# Patient Record
Sex: MALE | URBAN - METROPOLITAN AREA
[De-identification: names, ages, dates, MRNs, and addresses within clinical notes are randomized per-mention and may not be internally consistent; named-entity substitution may affect disease eponyms.]

---

## 2019-03-25 NOTE — PROGRESS NOTES
"Date of Service: 3/25/2019    Consult Requested By: ANA LILIA    Reason for Consultation: Chronic HCV     History of Present Illness:   Carrillo Garcia is a 33 y.o.  Pt has a past medical history of treatment naive HCV with associated cirrhosis per Fibrosure.  Compensated , CTP class A.  Patient with no specific complaints today other than generalized fatigue.    Genotype: Unknown    resistance: Unknown  Prior HCV treatment: None  Diagnosed with HCV:  2010  Risk factors:  IVDU   HCV viral load at start of treatment:  No lab   Fibrosis/cirrhosis: 0.75, F4  Child-Florence-Ceja Score: A      Review Of Systems:  Review of Systems   Constitutional: Positive for malaise/fatigue. Negative for fever and weight loss.   HENT: Negative for hearing loss.    Eyes: Positive for blurred vision.   Respiratory: Negative for cough, sputum production and shortness of breath.    Cardiovascular: Negative for chest pain and leg swelling.   Gastrointestinal: Positive for abdominal pain. Negative for constipation, diarrhea, nausea and vomiting.   Genitourinary: Negative for dysuria.   Musculoskeletal: Positive for myalgias. Negative for joint pain.   Skin: Negative for itching and rash.   Neurological: Positive for headaches.   Psychiatric/Behavioral: The patient is not nervous/anxious.      PMH:   None other then HCV      PSH:  Unknown       FAMILY HX:  Father with heart disease    SOCIAL HX:  ETOH: Denies, prior heavy use   Tobacco: Quit 7 years ago, smoked 1 ppd for 10 years   Drug use: Denies, prior IVDU  Sexual activity: Denies    Allergies/Intolerances:  NKDA    Other Current Medications:  No current outpatient prescriptions on file.  Confirmed    Most Recent Vital Signs:  Wgt: 298 pnds  Hgt: 6'1\"  Temp:  97.6  HR: 78  BP: 138/87  Ox: 98%     Physical Exam   This consultation was conducted the physical exam report from the nurse on site.    Physical Exam   Constitutional: He is oriented to person, place, and time and well-developed, " well-nourished, and in no distress.   HENT:   Head: Normocephalic and atraumatic.   Eyes: Conjunctivae and EOM are normal.   Cardiovascular: Normal rate, regular rhythm and normal heart sounds.    Pulmonary/Chest: Effort normal and breath sounds normal.   Abdominal: Soft. Bowel sounds are normal. He exhibits no distension. There is no tenderness. There is no guarding.   Musculoskeletal: Normal range of motion. He exhibits no edema.   Neurological: He is alert and oriented to person, place, and time.   Skin: Skin is warm and dry.   Psychiatric: Affect normal.       Vaccines    There is no immunization history on file for this patient.    HAV: none documented  HBV: none documented  PCN 23 - none documented  TDap - none documented  Influ - none documented    Pertinent Lab Results:    Date  HCV Viral Load      Screening:   HBV  - Surface antigen: 3/2/19 neg   - Surface antibody IgG:   - Core antibody IgG: 3/2/19 total neg   HAV IgG antibody:  HIV: 3/2/19 neg         CBC  Date  WBC  HGB  PLAT  3/2/19  7.2  18  165      LABS  Date  CR/BUN GFR  E-LYTS  3/2/19   0.95/18 WNL  WNL      Date  PT/INR  TBili  AlkPh  AST ALT Album  3/2/19  11/ 1.0  1.4  79  71 188 4.4    Lipids   Date  Chol Trig HDL VLDL LDL    HgbA1C      Imaging/Studies:    Liver US:   EGD:     ASSESSMENT:     Carrillo Garcia is a 33 y.o.  Pt has a past medical history of treatment naive HCV with associated cirrhosis per Fibrosure.  Compensated , CTP class A.  Patient with no specific complaints today other than generalized fatigue.  He is agreeable to start hepatitis C treatment if recommended.       PLAN:     HCV   AASLD recommendation prior to treatment:    --- Assessment of drug drug interaction: done and no interaction suspected per [place] HEP interaction  -patient on no other medications   --- Counseled patient on proper administration of antibiotics, frequency, food effect, missed doses and crucial importance of adherence.   --- Obtain  Quantitative HCV RNA (viral load) this will need to be obtained PRIOR to starting therapy.  --- Patient will need HCV genotype testing and review PRIOR to starting therapy  --- Hep B surface antibody IgG or total- qual . If patient does NOT have immunity then will need HepB vaccine series.   --- Hgb A1C  --- Fasting Lipid panel   --- Vaccines:  Hep B series if not immune, Hep A series, PCN 23, Tdap   --- US of liver - PRIOR to starting therapy   --- EGD for varices patient has cirrhosis     --- Obtain labs and liver US as above and once reviewed (need genotype and viral load and prefer US prior to starting treatment) then will make recommendation regarding therapy      --- 4 weeks from start of therapy: obtain eGFR, creatinine, hepatic function panel (note: A 10x increase in ALT at any time should prompt immediate DC of therapy, if ALT rising then test q2 weeks)   --- HCV RNA quant at 4 weeks from start of therapy and 12 weeks after completing therapy   --- On all new labs and imaging send documentation once otained to [facility] and notify me.   --- Patient with cirrhosis so will need liver US or other dedicated imaging every 6 months     --- Follow-up visit in ~8 weeks       Chhaya Vides M.D.

## 2019-03-26 ENCOUNTER — TELEMEDICINE2 (OUTPATIENT)
Dept: VASCULAR LAB | Facility: MEDICAL CENTER | Age: 34
End: 2019-03-26
Attending: INTERNAL MEDICINE
Payer: OTHER GOVERNMENT

## 2019-03-26 VITALS
WEIGHT: 298 LBS | TEMPERATURE: 98.6 F | SYSTOLIC BLOOD PRESSURE: 138 MMHG | HEART RATE: 78 BPM | DIASTOLIC BLOOD PRESSURE: 87 MMHG | RESPIRATION RATE: 20 BRPM

## 2019-03-26 DIAGNOSIS — B18.2 CHRONIC HEPATITIS C WITHOUT HEPATIC COMA (HCC): ICD-10-CM

## 2019-03-26 DIAGNOSIS — K74.69 COMPENSATED HCV CIRRHOSIS (HCC): ICD-10-CM

## 2019-03-26 DIAGNOSIS — B19.20 COMPENSATED HCV CIRRHOSIS (HCC): ICD-10-CM

## 2019-03-26 PROCEDURE — 99202 OFFICE O/P NEW SF 15 MIN: CPT | Performed by: INTERNAL MEDICINE

## 2019-03-26 ASSESSMENT — ENCOUNTER SYMPTOMS
FEVER: 0
NAUSEA: 0
COUGH: 0
ABDOMINAL PAIN: 1
DIARRHEA: 0
VOMITING: 0
NERVOUS/ANXIOUS: 0
SPUTUM PRODUCTION: 0
BLURRED VISION: 1
SHORTNESS OF BREATH: 0
MYALGIAS: 1
WEIGHT LOSS: 0
CONSTIPATION: 0
HEADACHES: 1

## 2019-03-28 RX ORDER — VELPATASVIR AND SOFOSBUVIR 100; 400 MG/1; MG/1
1 TABLET, FILM COATED ORAL DAILY
Qty: 28 TAB | Refills: 2 | Status: SHIPPED | OUTPATIENT
Start: 2019-03-28 | End: 2019-04-25

## 2019-05-06 ENCOUNTER — DOCUMENTATION (OUTPATIENT)
Dept: VASCULAR LAB | Facility: MEDICAL CENTER | Age: 34
End: 2019-05-06

## 2019-05-06 DIAGNOSIS — B18.2 CHRONIC HEPATITIS C WITHOUT HEPATIC COMA (HCC): ICD-10-CM

## 2019-05-06 RX ORDER — VELPATASVIR AND SOFOSBUVIR 100; 400 MG/1; MG/1
1 TABLET, FILM COATED ORAL DAILY
Qty: 28 TAB | Refills: 2 | Status: SHIPPED | OUTPATIENT
Start: 2019-05-06 | End: 2019-06-03

## 2019-05-06 NOTE — PROGRESS NOTES
Results obtained as requested per prior note, see below.        ASSESSMENT:      Carrillo Garcia is a 33 y.o.  Pt has a past medical history of treatment naive HCV with associated cirrhosis per Fibrosure.  Compensated , CTP class A.  Patient with no specific complaints today other than generalized fatigue.  He is agreeable to start hepatitis C treatment if recommended.         PLAN:      HCV   AASLD recommendation prior to treatment:    --- Assessment of drug drug interaction: done and no interaction suspected per [place] HEP interaction  -patient on no other medications   --- Counseled patient on proper administration of antibiotics, frequency, food effect, missed doses and crucial importance of adherence.   --- Obtain Quantitative HCV RNA (viral load) this will need to be obtained PRIOR to starting therapy:  5,920,000   --- Patient will need HCV genotype testing and review PRIOR to starting therapy:   1a  --- Hep B surface antibody IgG or total- qual . If patient does NOT have immunity then will need HepB vaccine series. :  Not reactive - NOT immune   --- Hgb A1C: Done 5.1   --- Fasting Lipid panel :  Done   --- Vaccines:  Hep B series if not immune, Hep A series, PCN 23, Tdap   --- US of liver - PRIOR to starting therapy : No concerning findings such as mass.  --- EGD for varices if patient has cirrhosis - no cirrhosis her US      --- START THERAPY  Epclusa- order placed   --- Initiate Hep B vaccine series, if Twinrix given this will also vaccinate for Hep A, other vaccines as above      --- 4 weeks from start of therapy: obtain eGFR, creatinine, hepatic function panel (note: A 10x increase in ALT at any time should prompt immediate DC of therapy, if ALT rising then test q2 weeks)   --- HCV RNA quant at 4 weeks from start of therapy and 12 weeks after completing therapy   --- On all new labs and imaging send documentation once otained to [facility] and notify me.   --- Patient with cirrhosis so will need  liver US or other dedicated imaging every 6 months

## 2019-06-30 NOTE — PROGRESS NOTES
[facility] follow up     Date of Service: 6/29/2019    Referring Physician: ANA LILIA    Chief Complaint:  Follow-up for hepatitis C      History of Present Illness:     Carrillo Garcia is a 34 y.o. Male. Carrillo Garcia is a 33 y.o.  Pt has a past medical history of treatment naive HCV with associated cirrhosis per Fibrosure.  Compensated , CTP class A.  Seen in clinic for treatment of hepatitis C on 3/26/2019, pending labs.  Labs were obtained on 5/6/2019, treatment was initiated.    Patient was started on Epclusa daily on 5/13/2019.  Patient states that he experienced no side effects with this medication, and has not missed any doses.  He did have a couple of episodes of mild headache initially, but these spontaneously resolved quickly.    HCV genotype: 1 a  HCV resistance: Unavailable  Prior treatment status: Naïve  Risk factors: IV drug use  Cirrhosis: FibroSure 0.75, F4 cirrhosis  CTP score: Class A  HCV PCR at start of treatment 5,920,000  HBV serologies: Nonimmune 5/2019  HAV serology: Nonimmune  HIV Ab: -3/2/2019    Review of Systems:  All other systems reviewed and are negative expect as noted in HPI    Past medical history  Chronic hepatitis C  Compensated cirrhosis    No past surgical history on file.    family history  Reviewed and not pertinent.      Social History     Social History   • Marital status: Unknown     Spouse name: N/A   • Number of children: N/A   • Years of education: N/A     Occupational History   • Not on file.     Social History Main Topics   • Smoking status: Former Smoker     Packs/day: 1.00     Years: 10.00     Quit date: 2012   • Smokeless tobacco: Never Used   • Alcohol use No   • Drug use: No   • Sexual activity: Not on file     Other Topics Concern   • Not on file     Social History Narrative   • No narrative on file       No Known Allergies    Medications:  No current outpatient prescriptions on file prior to visit.     No current  facility-administered medications on file prior to visit.        Physical Exam:   This consultation was conducted utilizing secure and encrypted videoconferencing equipment with the assistance of a trained tele-presenter at the originating site.     Vital Signs: T 97.8 HR 74 RR 18 /83 O2 97% weight 300 pounds height 6 foot 1 inch  Vital signs reviewed   Constitutional: Patient is oriented to person, place, and time. Appears well-developed and well-nourished. No distress  Head: Atraumatic, normocephalic  Eyes: Conjunctivae normal, EOM intact. Pupils are equal, round, and reactive to light.   Mouth/Throat: Lips without lesions, good dentition, oropharynx is clear and moist.  Neck: Neck supple. No masses/lymphadenopathy  Cardiovascular: Normal rate, regular rhythm, normal S1S2 and intact distal pulses. No murmur, gallop, or friction rub. No pedal edema.  Pulmonary/Chest: No respiratory distress. Unlabored respiratory effort, lungs clear to auscultation. No wheezes or rales.   Abdominal: Soft, non tender. BS + x 4. No masses or hepatosplenomegaly.   Musculoskeletal: No joint tenderness, swelling, erythema, or restriction of motion noted.  Neurological: Alert and oriented to person, place, and time. No gross cranial nerve deficit.   Skin: Skin is warm and dry. No rashes or embolic phenomena noted on exposed skin  Psychiatric: Normal mood and affect. Behavior is normal.     LABS:  No results found for: WBC, RBC, HEMOGLOBIN, HEMATOCRIT, MCV, MCH, MCHC, MPV  No results found for: SODIUM, POTASSIUM, CHLORIDE, CO2, GLUCOSE, BUN, CREATININE, BUNCREATRAT, GLOMRATE  No results found for: ALKPHOSPHAT, ASTSGOT, ALTSGPT, TBILIRUBIN   No results found for: CPKTOTAL     MICRO:  No results found for: BLOODCULTU, BLDCULT, BCHOLD      Latest pertinent labs were reviewed    IMAGING STUDIES:  Liver ultrasound with no masses noted    Assessment:   Carrillo Garcia is a 34 y.o. male with a history of chronic hepatitis C, compensated  cirrhosis, here for follow-up of hepatitis C.     Plan:   -Please scan in the labs that were presented verbally this visit  -Started Epclusa on 5/13/2019  -4-week follow-up labs not available, but per verbal report from remote RN, AST 25 ALT 27, T bili 1.4, undetectable hepatitis C quantitative PCR  -Continue PO Epclusa 1 tab daily x 12 week course  -Medication interactions.:  No new medications  -Check HCV RNA quantitative PCR at completion of therapy, and at 12 weeks after completing therapy   -On all new labs and imaging send documentation once otained to [facility] and please notify me or Dr. Vides   -This patient with cirrhosis will need hepatocellular carcinoma surveillance every 6 months with imaging. Recommend enrolling in GI cirrhosis clinic  -Recommend hepatitis A and hepatitis B vaccination series    Return to clinic at end of therapy    Pillo Brunner M.D.    Please note that this dictation was created using voice recognition software. I have worked with technical experts from [facility] to optimize the interface.  I have made every reasonable attempt to correct obvious errors, but there may be errors of grammar and possibly content that I did not discover before finalizing the note.

## 2019-07-01 ENCOUNTER — TELEMEDICINE2 (OUTPATIENT)
Dept: VASCULAR LAB | Facility: MEDICAL CENTER | Age: 34
End: 2019-07-01
Attending: INTERNAL MEDICINE
Payer: OTHER GOVERNMENT

## 2019-07-01 DIAGNOSIS — B18.2 CHRONIC HEPATITIS C WITHOUT HEPATIC COMA (HCC): ICD-10-CM

## 2019-07-01 DIAGNOSIS — K74.69 OTHER CIRRHOSIS OF LIVER (HCC): ICD-10-CM

## 2019-07-01 PROCEDURE — 99213 OFFICE O/P EST LOW 20 MIN: CPT | Performed by: INTERNAL MEDICINE

## 2019-10-17 ENCOUNTER — DOCUMENTATION (OUTPATIENT)
Dept: VASCULAR LAB | Facility: MEDICAL CENTER | Age: 34
End: 2019-10-17

## 2019-10-17 NOTE — PROGRESS NOTES
Reviewed labs after completion of Epclusa therapy. HCV PCR undetectable.    Repeat HCV quant PCR 12 weeks after completion of therapy (around 10/27/2019)

## 2019-10-20 NOTE — PROGRESS NOTES
[facility]     Date of Service: 10/20/2019    Referring Physician: ANA LILIA    Chief Complaint:  Follow-up for hepatitis C        History of Present Illness:      Carrillo Garcia is a 34 y.o. Male. Carrillo Garcia is a 33 y.o.  Pt has a past medical history of treatment naive HCV with associated cirrhosis per Fibrosure.  Compensated , CTP class A.  Seen in clinic for treatment of hepatitis C on 3/26/2019, pending labs.  Labs were obtained on 5/6/2019, treatment was initiated.     Patient was started on Epclusa daily on 5/13/2019.  Patient states that he experienced no side effects with this medication, and has not missed any doses.  He did have a couple of episodes of mild headache initially, but these spontaneously resolved quickly.    Patient completed Epclusa 8/4/2019, and HCV quant PCR obtained after completion of therapy was undetectable.  He is nearing 12 weeks after completion of Epclusa.    Reports that his prior symptoms of tiredness, achy joints, an headache have all resolved now.    HCV genotype: 1 a  HCV resistance: Unavailable  Prior treatment status: Naïve  Risk factors: IV drug use  Cirrhosis: FibroSure 0.75, F4 cirrhosis  CTP score: Class A  HCV PCR at start of treatment 5,920,000  HBV serologies: Nonimmune 5/2019  HAV serology: Nonimmune  HIV Ab: -3/2/2019     Review of Systems:  All other systems reviewed and are negative expect as noted in HPI     Past medical history  Chronic hepatitis C  Compensated cirrhosis    No past surgical history on file.    Family history  Reviewed and not pertinent.    Social History     Socioeconomic History   • Marital status: Unknown     Spouse name: Not on file   • Number of children: Not on file   • Years of education: Not on file   • Highest education level: Not on file   Occupational History   • Not on file   Social Needs   • Financial resource strain: Not on file   • Food insecurity:     Worry: Not on file     Inability:  "Not on file   • Transportation needs:     Medical: Not on file     Non-medical: Not on file   Tobacco Use   • Smoking status: Former Smoker     Packs/day: 1.00     Years: 10.00     Pack years: 10.00     Last attempt to quit: 2012     Years since quittin.8   • Smokeless tobacco: Never Used   Substance and Sexual Activity   • Alcohol use: No   • Drug use: No   • Sexual activity: Not on file   Lifestyle   • Physical activity:     Days per week: Not on file     Minutes per session: Not on file   • Stress: Not on file   Relationships   • Social connections:     Talks on phone: Not on file     Gets together: Not on file     Attends Hinduism service: Not on file     Active member of club or organization: Not on file     Attends meetings of clubs or organizations: Not on file     Relationship status: Not on file   • Intimate partner violence:     Fear of current or ex partner: Not on file     Emotionally abused: Not on file     Physically abused: Not on file     Forced sexual activity: Not on file   Other Topics Concern   • Not on file   Social History Narrative   • Not on file       No Known Allergies    Medications:  No current outpatient medications on file prior to visit.     No current facility-administered medications on file prior to visit.        Physical Exam:   This consultation was conducted utilizing secure and encrypted videoconferencing equipment with the assistance of a trained tele-presenter at the originating site.     Vital Signs: T 98.1 HR 73 RR 20 /88 o2 98% Wt 296 Ht 6' 1\"  Vital signs reviewed  Constitutional: Patient is oriented to person, place, and time. Appears well-developed and well-nourished. No distress  Head: Atraumatic, normocephalic  Eyes: Conjunctivae normal, EOM intact. Pupils are equal, round, and reactive to light.   Mouth/Throat: Lips without lesions, oropharynx is clear and moist.  Neck: Neck supple. No masses/lymphadenopathy  Cardiovascular: Normal rate, regular rhythm, " normal S1S2 and intact distal pulses. No murmur, gallop, or friction rub. No pedal edema.  Pulmonary/Chest: No respiratory distress. Unlabored respiratory effort, lungs clear to auscultation. No wheezes or rales.   Abdominal: Soft, non tender. BS + x 4. No masses or hepatosplenomegaly.   Musculoskeletal: No joint tenderness, swelling, erythema, or restriction of motion noted.  Neurological: Alert and oriented to person, place, and time. No gross cranial nerve deficit.   Skin: Skin is warm and dry. No rashes or embolic phenomena noted on exposed skin  Psychiatric: Normal mood and affect. Behavior is normal.     LABS:  No results found for: WBC, RBC, HEMOGLOBIN, HEMATOCRIT, MCV, MCH, MCHC, MPV  No results found for: SODIUM, POTASSIUM, CHLORIDE, CO2, GLUCOSE, BUN, CREATININE, BUNCREATRAT, GLOMRATE  No results found for: ALKPHOSPHAT, ASTSGOT, ALTSGPT, TBILIRUBIN   No results found for: CPKTOTAL     MICRO:  No results found for: BLOODCULTU, BLDCULT, BCHOLD      Latest pertinent labs were reviewed    Assessment:   Carrillo Garcia is a 34 y.o. male with a history of chronic hepatitis C, compensated cirrhosis, here for follow-up after treatment of hepatitis C.      Plan:   -Started Epclusa on 5/13/2019 and completed on 8/4/2019  -After completion of Epclusa therapy, HCV PCR was undetectable  -Repeat HCV quant PCR 12 weeks after completion of therapy (around 10/26/2019)  -This patient with cirrhosis will need hepatocellular carcinoma surveillance every 6 months with imaging. Recommend enrolling in GI cirrhosis clinic  -Completed hepatitis A and hepatitis B vaccination series per verbal report     No follow-up needed at infectious disease clinic, but please send us the above lab    Pillo Brunner M.D.    Please note that this dictation was created using voice recognition software. I have worked with technical experts from [employer] to optimize the interface.  I have made every reasonable attempt to correct obvious  errors, but there may be errors of grammar and possibly content that I did not discover before finalizing the note.

## 2019-10-21 ENCOUNTER — TELEMEDICINE2 (OUTPATIENT)
Dept: VASCULAR LAB | Facility: MEDICAL CENTER | Age: 34
End: 2019-10-21
Attending: INTERNAL MEDICINE
Payer: OTHER GOVERNMENT

## 2019-10-21 DIAGNOSIS — B18.2 CHRONIC HEPATITIS C WITHOUT HEPATIC COMA (HCC): ICD-10-CM

## 2019-10-21 DIAGNOSIS — K74.69 COMPENSATED HCV CIRRHOSIS (HCC): ICD-10-CM

## 2019-10-21 DIAGNOSIS — B19.20 COMPENSATED HCV CIRRHOSIS (HCC): ICD-10-CM

## 2019-10-21 PROCEDURE — 99212 OFFICE O/P EST SF 10 MIN: CPT | Performed by: INTERNAL MEDICINE

## 2019-12-17 ENCOUNTER — DOCUMENTATION (OUTPATIENT)
Dept: INFECTIOUS DISEASES | Facility: MEDICAL CENTER | Age: 34
End: 2019-12-17

## 2019-12-18 NOTE — PROGRESS NOTES
Per last ID note:     Assessment:   Carrillo Garcia is a 34 y.o. male with a history of chronic hepatitis C, compensated cirrhosis, here for follow-up after treatment of hepatitis C.      Plan:   -Started Epclusa on 5/13/2019 and completed on 8/4/2019  -After completion of Epclusa therapy, HCV PCR was undetectable  -Repeat HCV quant PCR 12 weeks after completion of therapy (around 10/26/2019)  -This patient with cirrhosis will need hepatocellular carcinoma surveillance every 6 months with imaging. Recommend enrolling in GI cirrhosis clinic  -Completed hepatitis A and hepatitis B vaccination series per verbal report     No follow-up needed at infectious disease clinic, but please send us the above lab    --- Reviewed labs from 11/25/19 and patient HCV RNA PCR is NOT DETECTED. This represents SVR and cure of Hepatitis C.  No further follow-up is needed unless new concerns.       Chhaya Vides MD  Infectious Diseases   [facility]